# Patient Record
(demographics unavailable — no encounter records)

---

## 2025-06-04 NOTE — ASSESSMENT
[FreeTextEntry1] : Patient to obtain hearing test and will f/u for results.  Pt to use Olive Oil intranasally to prevent dryness.

## 2025-06-04 NOTE — PHYSICAL EXAM
[Midline] : trachea located in midline position [Normal] : no rashes [de-identified] : L TM with monomeric region in anterior inferior region, c/w healed perforation.  [de-identified] : Anterior septal crusting bilaterally.  No active bleeding.

## 2025-06-04 NOTE — HISTORY OF PRESENT ILLNESS
[de-identified] : 64F presents for an evaluation of a left sided perforated ear drum. Patient has h/o right sided ear surgery when she was 16 years old and states she cannot hear from right side. She admits to left sided tinnitus, dull deep left otalgia, gradual left hearing loss, and vertigo described as the room spinning.  Patient denies otorrhea, recent ear infections. Pt also reports intermittent epistaxis.

## 2025-06-04 NOTE — CONSULT LETTER
[Dear  ___] : Dear  [unfilled], [Please see my note below.] : Please see my note below. [Sincerely,] : Sincerely, [Consult Letter:] : I had the pleasure of evaluating your patient, [unfilled]. [Consult Closing:] : Thank you very much for allowing me to participate in the care of this patient.  If you have any questions, please do not hesitate to contact me. [FreeTextEntry2] : Yared Bishop MD  [FreeTextEntry3] : Chrissy Sprague PA-C Department of Otolaryngology [facility] [place]   Antwan Sullivan MD, FACS Chief of Otolaryngology at [facility]  Dept. of Otolaryngology [facility]

## 2025-06-04 NOTE — REASON FOR VISIT
[Initial Evaluation] : an initial evaluation for [FreeTextEntry2] : left sided perforated ear drum.

## 2025-06-24 NOTE — REVIEW OF SYSTEMS
[As Noted in HPI] : as noted in HPI [Abdominal Pain] : abdominal pain [Vomiting] : no vomiting [Constipation] : no constipation [Diarrhea] : diarrhea [Heartburn] : no heartburn [Melena (black stool)] : no melena [Bleeding] : no bleeding [Fecal Incontinence (soiling)] : no fecal incontinence [Bloating (gassiness)] : bloating [Negative] : Heme/Lymph

## 2025-06-24 NOTE — ASSESSMENT
[FreeTextEntry1] : Impression: Change in bowel habits with chronic diarrhea rule out colonic neoplasm and/or colitis.  H. pylori infection that was treated in 2023 but was never tested for eradication.  Patient presently has no upper GI symptoms or complaints.  Recommendations: H. pylori urea breath test was ordered to document successful H. pylori eradication.  Cholestyramine 4 g once or twice daily was prescribed for treatment of possible bile salt induced diarrhea status postcholecystectomy.  Repeat colonoscopy was advised for further evaluation of the above.  The risk versus benefits of repeat colonoscopy and intravenous sedation, and alternative testing such as virtual colonoscopy, were individually explained to the patient and her daughter who accompanied the patient today and acted as an  for the patient who speaks limited English.  Patient appeared to understand all of the above and was agreeable to proceeding with repeat colonoscopy.  ASA classification is 3.  She will be prepped for Suflave and is medically optimized for the proposed colonoscopy and seemed to understand all of the above instructions, information, and management plan.  I reviewed her previous upper endoscopy and colonoscopy pathology reports as part of this office evaluation today.

## 2025-06-24 NOTE — REASON FOR VISIT
[Follow-up] : a follow-up of an existing diagnosis [FreeTextEntry1] : For change in bowel habits with chronic diarrhea

## 2025-06-24 NOTE — HISTORY OF PRESENT ILLNESS
[de-identified] : 2023.  Hiatal hernia and H. pylori infection.  2019.  Mild erosive esophagitis. [FreeTextEntry1] : 2019.  Rectal polyp.

## 2025-06-24 NOTE — PHYSICAL EXAM
[Alert] : alert [Normal Voice/Communication] : normal voice/communication [Healthy Appearing] : healthy appearing [No Acute Distress] : no acute distress [Well Developed] : well developed [Well Nourished] : well nourished [Obese (BMI >= 30)] : obese (BMI >= 30) [Sclera] : the sclera and conjunctiva were normal [Hearing Threshold Finger Rub Not [place]] : hearing was normal [Normal Lips/Gums] : the lips and gums were normal [Oropharynx] : the oropharynx was normal [Normal Appearance] : the appearance of the neck was normal [No Neck Mass] : no neck mass was observed [No Respiratory Distress] : no respiratory distress [No Acc Muscle Use] : no accessory muscle use [Respiration, Rhythm And Depth] : normal respiratory rhythm and effort [Auscultation Breath Sounds / Voice Sounds] : lungs were clear to auscultation bilaterally [Heart Rate And Rhythm] : heart rate was normal and rhythm regular [Normal S1, S2] : normal S1 and S2 [Murmurs] : no murmurs [None] : no edema [Bowel Sounds] : normal bowel sounds [Abdomen Tenderness] : non-tender [No Masses] : no abdominal mass palpated [Abdomen Soft] : soft [] : no hepatosplenomegaly [No CVA Tenderness] : no CVA  tenderness [Abnormal Walk] : normal gait [No Joint Swelling] : no joint swelling seen [Normal Color / Pigmentation] : normal skin color and pigmentation [Oriented To Time, Place, And Person] : oriented to person, place, and time [de-identified] : Deferred until colonoscopy.